# Patient Record
Sex: FEMALE | Race: WHITE | NOT HISPANIC OR LATINO | Employment: OTHER | URBAN - METROPOLITAN AREA
[De-identification: names, ages, dates, MRNs, and addresses within clinical notes are randomized per-mention and may not be internally consistent; named-entity substitution may affect disease eponyms.]

---

## 2024-06-10 DIAGNOSIS — M54.9 BACK PAIN, UNSPECIFIED BACK LOCATION, UNSPECIFIED BACK PAIN LATERALITY, UNSPECIFIED CHRONICITY: ICD-10-CM

## 2024-06-10 DIAGNOSIS — S93.409A SPRAIN OF ANKLE, UNSPECIFIED LATERALITY, UNSPECIFIED LIGAMENT, INITIAL ENCOUNTER: ICD-10-CM

## 2024-08-01 PROBLEM — R13.14 DYSPHAGIA, PHARYNGOESOPHAGEAL PHASE: Status: ACTIVE | Noted: 2024-08-01

## 2024-08-01 PROBLEM — L57.0 ACTINIC KERATOSIS: Status: ACTIVE | Noted: 2022-08-09

## 2024-08-01 PROBLEM — J38.01 VOCAL CORD PARALYSIS, UNILATERAL COMPLETE: Status: ACTIVE | Noted: 2024-08-01

## 2024-08-01 PROBLEM — D36.9 MULTIPLE ADENOMATOUS POLYPS: Status: ACTIVE | Noted: 2024-08-01

## 2024-08-01 PROBLEM — R10.13 EPIGASTRIC PAIN: Status: ACTIVE | Noted: 2024-08-01

## 2024-08-01 PROBLEM — N95.2 ATROPHY OF VAGINA: Status: ACTIVE | Noted: 2024-08-01

## 2024-08-01 PROBLEM — D18.01 HEMANGIOMA OF SKIN AND SUBCUTANEOUS TISSUE: Status: ACTIVE | Noted: 2022-08-09

## 2024-08-01 PROBLEM — R32 URINARY INCONTINENCE: Status: ACTIVE | Noted: 2024-08-01

## 2024-08-01 PROBLEM — M79.2 NEUROPATHIC PAIN: Status: ACTIVE | Noted: 2024-08-01

## 2024-08-01 PROBLEM — M81.8 IDIOPATHIC OSTEOPOROSIS: Status: ACTIVE | Noted: 2024-08-01

## 2024-08-01 PROBLEM — R11.0 NAUSEATED: Status: ACTIVE | Noted: 2024-08-01

## 2024-08-01 PROBLEM — F17.200 TOBACCO USE DISORDER: Status: ACTIVE | Noted: 2024-08-01

## 2024-08-01 PROBLEM — D48.5 NEOPLASM OF UNCERTAIN BEHAVIOR OF SKIN: Status: ACTIVE | Noted: 2022-08-09

## 2024-08-01 PROBLEM — M54.16 LUMBAR RADICULOPATHY: Status: ACTIVE | Noted: 2024-08-01

## 2024-08-01 PROBLEM — J38.1 VOCAL CORD POLYP: Status: ACTIVE | Noted: 2024-08-01

## 2024-08-01 PROBLEM — S32.010S: Status: ACTIVE | Noted: 2024-08-01

## 2024-08-01 PROBLEM — E78.5 HYPERLIPIDEMIA: Status: ACTIVE | Noted: 2024-08-01

## 2024-08-01 PROBLEM — M54.6 BACK PAIN, THORACIC: Status: ACTIVE | Noted: 2024-08-01

## 2024-08-01 PROBLEM — R63.4 ABNORMAL WEIGHT LOSS: Status: ACTIVE | Noted: 2024-08-01

## 2024-08-01 PROBLEM — J98.09 RECURRENT BRONCHOSPASM: Status: ACTIVE | Noted: 2024-08-01

## 2024-08-01 PROBLEM — K21.9 GERD (GASTROESOPHAGEAL REFLUX DISEASE): Status: ACTIVE | Noted: 2024-08-01

## 2024-08-01 PROBLEM — L21.9 SEBORRHEIC DERMATITIS, UNSPECIFIED: Status: ACTIVE | Noted: 2022-08-09

## 2024-08-01 PROBLEM — K63.5 MULTIPLE BENIGN POLYPS OF LARGE INTESTINE: Status: ACTIVE | Noted: 2024-08-01

## 2024-08-01 PROBLEM — E55.9 MILD VITAMIN D DEFICIENCY: Status: ACTIVE | Noted: 2024-08-01

## 2024-08-01 PROBLEM — R49.0 HOARSENESS OF VOICE: Status: ACTIVE | Noted: 2024-08-01

## 2024-08-06 ENCOUNTER — APPOINTMENT (OUTPATIENT)
Dept: PRIMARY CARE | Facility: CLINIC | Age: 79
End: 2024-08-06
Payer: MEDICARE

## 2024-08-06 VITALS
WEIGHT: 108 LBS | SYSTOLIC BLOOD PRESSURE: 166 MMHG | OXYGEN SATURATION: 100 % | HEART RATE: 74 BPM | DIASTOLIC BLOOD PRESSURE: 79 MMHG | BODY MASS INDEX: 19.75 KG/M2

## 2024-08-06 DIAGNOSIS — F17.211 CIGARETTE NICOTINE DEPENDENCE IN REMISSION: ICD-10-CM

## 2024-08-06 DIAGNOSIS — E78.5 DYSLIPIDEMIA: ICD-10-CM

## 2024-08-06 DIAGNOSIS — B37.83 ANGULAR CHEILITIS WITH CANDIDIASIS: ICD-10-CM

## 2024-08-06 DIAGNOSIS — M25.50 ARTHRALGIA, UNSPECIFIED JOINT: ICD-10-CM

## 2024-08-06 DIAGNOSIS — Z12.31 VISIT FOR SCREENING MAMMOGRAM: ICD-10-CM

## 2024-08-06 DIAGNOSIS — M79.2 NEUROPATHIC PAIN: Primary | ICD-10-CM

## 2024-08-06 DIAGNOSIS — M81.8 IDIOPATHIC OSTEOPOROSIS: ICD-10-CM

## 2024-08-06 PROCEDURE — 1170F FXNL STATUS ASSESSED: CPT | Performed by: INTERNAL MEDICINE

## 2024-08-06 PROCEDURE — 1159F MED LIST DOCD IN RCRD: CPT | Performed by: INTERNAL MEDICINE

## 2024-08-06 PROCEDURE — G0439 PPPS, SUBSEQ VISIT: HCPCS | Performed by: INTERNAL MEDICINE

## 2024-08-06 PROCEDURE — 1160F RVW MEDS BY RX/DR IN RCRD: CPT | Performed by: INTERNAL MEDICINE

## 2024-08-06 PROCEDURE — 99214 OFFICE O/P EST MOD 30 MIN: CPT | Performed by: INTERNAL MEDICINE

## 2024-08-06 RX ORDER — CLOTRIMAZOLE AND BETAMETHASONE DIPROPIONATE 10; .64 MG/G; MG/G
1 CREAM TOPICAL 2 TIMES DAILY
Qty: 15 G | Refills: 1 | Status: SHIPPED | OUTPATIENT
Start: 2024-08-06 | End: 2024-10-05

## 2024-08-06 RX ORDER — CLOTRIMAZOLE AND BETAMETHASONE DIPROPIONATE 10; .64 MG/G; MG/G
1 CREAM TOPICAL 2 TIMES DAILY
Qty: 15 G | Refills: 1 | Status: SHIPPED | OUTPATIENT
Start: 2024-08-06 | End: 2024-08-06

## 2024-08-06 ASSESSMENT — ACTIVITIES OF DAILY LIVING (ADL)
GROCERY_SHOPPING: NEEDS ASSISTANCE
TAKING_MEDICATION: INDEPENDENT
BATHING: INDEPENDENT
MANAGING_FINANCES: INDEPENDENT
DRESSING: INDEPENDENT
DOING_HOUSEWORK: NEEDS ASSISTANCE

## 2024-08-06 ASSESSMENT — PATIENT HEALTH QUESTIONNAIRE - PHQ9
SUM OF ALL RESPONSES TO PHQ9 QUESTIONS 1 AND 2: 2
1. LITTLE INTEREST OR PLEASURE IN DOING THINGS: SEVERAL DAYS
2. FEELING DOWN, DEPRESSED OR HOPELESS: SEVERAL DAYS

## 2024-08-06 NOTE — PATIENT INSTRUCTIONS
Neuropathic pain  Checking sugar and b12 level  You do not want any medication/pills for this  Will order a topical cream for neuropathy    Call [phone] to schedule mammogram and ct lung cancer screen (last year for this test)    Osteoporosis, severe, declines medication  Checking vitamin d level    Get fasting labs    Hand tingling is likely due to carpal tunnel. Check to see if pinky is spared, wearing wrist splints can help OR having surgery    Mouth rash/corners of mouth is from yeast  Can use lotrisone cream 1-2 times a day as needed for flares    Diffuse bone/arthritis pain, checking rheumatoid factor

## 2024-08-06 NOTE — PROGRESS NOTES
Subjective   Patient ID: Ludy Quintero is a 78 y.o. female who presents for Medicare Annual Wellness Visit Subsequent.    HPI     Review of Systems    Objective   Pulse 74   Wt 49 kg (108 lb)   SpO2 100%   BMI 19.75 kg/m²     Physical Exam    Assessment/Plan

## 2024-08-06 NOTE — PROGRESS NOTES
Subjective   Patient ID: Ludy Quintero is a 78 y.o. female who presents for Medicare Annual Wellness Visit Subsequent.    She has cataracts and is almost legally blind  Her fingers tingle  Her feet are numb and tingling  Her hip, some days she walks well and sometimes not  Sitting on her tailbone hurts for last 6-8 years  The hip that fractured on the left still hurts and the femur hurts  She uses a rollator to get around. Is an all terrain  She cannot walk with her cane, makes her bri sided  Her good friend  and has been very hard. Helping her friend's niece clear things out  She is showing signs of radiation poison. Under her left breast has a firm nodule that is sore.   Her right second finger is swelling.   Swells at the thumb and second fingers  She uses CBD cream on it at night.   Her feet are on fire all the time.     Her bp goes up and down, but usually in the normal range.   She does not want any prescription  She is taking vitamin b12 and vitamin d12. Takes cbd with omega 3.   That is the only thing that is helpful.     Miranga omephelia, she takes at times and got from rich and takes 2 of those a day.     She does not want treatment for her osteoporosis.     She cannot go into the laundry with strong smells, or any other strong smells, she cannot get her breath  She reacts to everything that I give her anymore.   Sometimes gets it with swallowing water but is not persistent.   She has used rescue inhaler and it landed her in the ER.   She gets swelling in her elbows and they throb and get red and huge.     She doesn't want to take any medication, but she does want to know what to expect.          Review of Systems    Objective   /79   Pulse 74   Wt 49 kg (108 lb)   SpO2 100%   BMI 19.75 kg/m²     Physical Exam  Constitutional:       Appearance: Normal appearance.   Neck:      Vascular: No carotid bruit.   Cardiovascular:      Rate and Rhythm: Normal rate and regular rhythm.       "Pulses: Normal pulses.      Heart sounds: No murmur heard.  Pulmonary:      Effort: Pulmonary effort is normal.      Breath sounds: Normal breath sounds.   Abdominal:      Palpations: Abdomen is soft. There is no mass.      Tenderness: There is no abdominal tenderness.      Comments: No HSM or pulsatile mass   Musculoskeletal:      Right lower leg: No edema.      Left lower leg: No edema.      Comments: No synoviitis noted  B/l thumbs palmar displacement at the CMC, worse on the right  Right second finger with heb node  No warmth or swelling at the elbows or wrists       Lymphadenopathy:      Cervical: No cervical adenopathy.   Skin:     Coloration: Skin is not jaundiced or pale.      Comments: Clubbing noted of the nails  Palms have red areas in clusters like telangectasias  No calcinosis noted  No raynaud's noted  No synovitis  Angles of the mouth with redness in the creases b/l    Multiple raised tan lesions on face and trunk/breasts   Neurological:      Mental Status: She is alert and oriented to person, place, and time.   Psychiatric:      Comments: PHQ9 is 12  GAD7 is 8         Assessment/Plan          Patient Instructions   Neuropathic pain  Checking sugar and b12 level  You do not want any medication/pills for this  Will order a topical cream for neuropathy    Call [phone] to schedule mammogram and ct lung cancer screen (last year for this test)    Osteoporosis, severe, declines medication  Checking vitamin d level    Get fasting labs    Hand tingling is likely due to carpal tunnel. Check to see if pinky is spared, wearing wrist splints can help OR having surgery    Mouth rash/corners of mouth is from yeast  Can use lotrisone cream 1-2 times a day as needed for flares    Diffuse bone/arthritis pain, checking rheumatoid factor     Pt is not interested in any medications or treatments due to prior side effects, but states \"I want to know what's coming\"    Declines all vaccines    Pt with mobility issues, " wrote script for ramp for house last week.    Telangectasia and clubbing but no other findings of scleroderma

## 2024-08-08 ENCOUNTER — LAB (OUTPATIENT)
Dept: LAB | Facility: LAB | Age: 79
End: 2024-08-08
Payer: MEDICARE

## 2024-08-08 DIAGNOSIS — E78.5 DYSLIPIDEMIA: ICD-10-CM

## 2024-08-08 DIAGNOSIS — M79.2 NEUROPATHIC PAIN: ICD-10-CM

## 2024-08-08 DIAGNOSIS — M81.8 IDIOPATHIC OSTEOPOROSIS: ICD-10-CM

## 2024-08-08 DIAGNOSIS — M25.50 ARTHRALGIA, UNSPECIFIED JOINT: ICD-10-CM

## 2024-08-08 LAB
ALBUMIN SERPL BCP-MCNC: 4.5 G/DL (ref 3.4–5)
ALP SERPL-CCNC: 58 U/L (ref 33–136)
ALT SERPL W P-5'-P-CCNC: 10 U/L (ref 7–45)
ANION GAP SERPL CALC-SCNC: 13 MMOL/L (ref 10–20)
AST SERPL W P-5'-P-CCNC: 10 U/L (ref 9–39)
BASOPHILS # BLD AUTO: 0.14 X10*3/UL (ref 0–0.1)
BASOPHILS NFR BLD AUTO: 1.5 %
BILIRUB SERPL-MCNC: 0.5 MG/DL (ref 0–1.2)
BUN SERPL-MCNC: 11 MG/DL (ref 6–23)
CALCIUM SERPL-MCNC: 9.5 MG/DL (ref 8.6–10.3)
CHLORIDE SERPL-SCNC: 104 MMOL/L (ref 98–107)
CHOLEST SERPL-MCNC: 246 MG/DL (ref 0–199)
CHOLESTEROL/HDL RATIO: 5.8
CO2 SERPL-SCNC: 31 MMOL/L (ref 21–32)
CREAT SERPL-MCNC: 0.96 MG/DL (ref 0.5–1.05)
CRP SERPL-MCNC: 0.12 MG/DL
EGFRCR SERPLBLD CKD-EPI 2021: 61 ML/MIN/1.73M*2
EOSINOPHIL # BLD AUTO: 0.23 X10*3/UL (ref 0–0.4)
EOSINOPHIL NFR BLD AUTO: 2.5 %
ERYTHROCYTE [DISTWIDTH] IN BLOOD BY AUTOMATED COUNT: 13.5 % (ref 11.5–14.5)
GLUCOSE SERPL-MCNC: 84 MG/DL (ref 74–99)
HCT VFR BLD AUTO: 42.5 % (ref 36–46)
HDLC SERPL-MCNC: 42.6 MG/DL
HGB BLD-MCNC: 13.8 G/DL (ref 12–16)
IMM GRANULOCYTES # BLD AUTO: 0.03 X10*3/UL (ref 0–0.5)
IMM GRANULOCYTES NFR BLD AUTO: 0.3 % (ref 0–0.9)
LDLC SERPL CALC-MCNC: 180 MG/DL
LYMPHOCYTES # BLD AUTO: 2.97 X10*3/UL (ref 0.8–3)
LYMPHOCYTES NFR BLD AUTO: 31.8 %
MCH RBC QN AUTO: 32.5 PG (ref 26–34)
MCHC RBC AUTO-ENTMCNC: 32.5 G/DL (ref 32–36)
MCV RBC AUTO: 100 FL (ref 80–100)
MONOCYTES # BLD AUTO: 0.58 X10*3/UL (ref 0.05–0.8)
MONOCYTES NFR BLD AUTO: 6.2 %
NEUTROPHILS # BLD AUTO: 5.4 X10*3/UL (ref 1.6–5.5)
NEUTROPHILS NFR BLD AUTO: 57.7 %
NON HDL CHOLESTEROL: 203 MG/DL (ref 0–149)
NRBC BLD-RTO: 0 /100 WBCS (ref 0–0)
PLATELET # BLD AUTO: 351 X10*3/UL (ref 150–450)
POTASSIUM SERPL-SCNC: 4.6 MMOL/L (ref 3.5–5.3)
PROT SERPL-MCNC: 6.7 G/DL (ref 6.4–8.2)
RBC # BLD AUTO: 4.24 X10*6/UL (ref 4–5.2)
SODIUM SERPL-SCNC: 143 MMOL/L (ref 136–145)
TRIGL SERPL-MCNC: 118 MG/DL (ref 0–149)
TSH SERPL-ACNC: 1.87 MIU/L (ref 0.44–3.98)
VLDL: 24 MG/DL (ref 0–40)
WBC # BLD AUTO: 9.4 X10*3/UL (ref 4.4–11.3)

## 2024-08-08 PROCEDURE — 83970 ASSAY OF PARATHORMONE: CPT

## 2024-08-08 PROCEDURE — 80061 LIPID PANEL: CPT

## 2024-08-08 PROCEDURE — 84443 ASSAY THYROID STIM HORMONE: CPT

## 2024-08-08 PROCEDURE — 85025 COMPLETE CBC W/AUTO DIFF WBC: CPT

## 2024-08-08 PROCEDURE — 36415 COLL VENOUS BLD VENIPUNCTURE: CPT

## 2024-08-08 PROCEDURE — 86140 C-REACTIVE PROTEIN: CPT

## 2024-08-08 PROCEDURE — 86431 RHEUMATOID FACTOR QUANT: CPT

## 2024-08-08 PROCEDURE — 82607 VITAMIN B-12: CPT

## 2024-08-08 PROCEDURE — 80053 COMPREHEN METABOLIC PANEL: CPT

## 2024-08-08 PROCEDURE — 82306 VITAMIN D 25 HYDROXY: CPT

## 2024-08-09 LAB
25(OH)D3 SERPL-MCNC: 33 NG/ML (ref 30–100)
PTH-INTACT SERPL-MCNC: 33.1 PG/ML (ref 18.5–88)
RHEUMATOID FACT SER NEPH-ACNC: <10 IU/ML (ref 0–15)
VIT B12 SERPL-MCNC: 419 PG/ML (ref 211–911)

## 2024-08-16 ENCOUNTER — HOSPITAL ENCOUNTER (OUTPATIENT)
Dept: RADIOLOGY | Facility: HOSPITAL | Age: 79
Discharge: HOME | End: 2024-08-16
Payer: MEDICARE

## 2024-08-16 VITALS — HEIGHT: 62 IN | BODY MASS INDEX: 20.24 KG/M2 | WEIGHT: 110 LBS

## 2024-08-16 DIAGNOSIS — F17.211 CIGARETTE NICOTINE DEPENDENCE IN REMISSION: ICD-10-CM

## 2024-08-16 DIAGNOSIS — Z12.31 VISIT FOR SCREENING MAMMOGRAM: ICD-10-CM

## 2024-08-16 PROCEDURE — 77063 BREAST TOMOSYNTHESIS BI: CPT | Performed by: STUDENT IN AN ORGANIZED HEALTH CARE EDUCATION/TRAINING PROGRAM

## 2024-08-16 PROCEDURE — 71271 CT THORAX LUNG CANCER SCR C-: CPT

## 2024-08-16 PROCEDURE — 77067 SCR MAMMO BI INCL CAD: CPT | Performed by: STUDENT IN AN ORGANIZED HEALTH CARE EDUCATION/TRAINING PROGRAM

## 2024-08-16 PROCEDURE — 77067 SCR MAMMO BI INCL CAD: CPT

## 2025-04-02 NOTE — PROGRESS NOTES
Subjective   Patient ID: Ludy Quintero is a 79 y.o. female who presents for Establish Care for follow up of chronic conditions   Today she is accompanied by alone.     HPI  Dyslipidemia  Patient currently is not taking any medication.  Last lab was done in August 2024 and were positive for cholesterol 246, HDL 42, , triglyceride 118.   Low-dose CT scan done in August 2024 was estimating coronary calcium score about 148.   Not open to medication    Hypertension  BP not at goal  Patient is not on any medication and reports having normal blood pressure at home.    had cancer who was diagnosed in the past 3 days.   Sees dr. Wang     Hx of brain aneurism in 2000  [place]alyse on the left side.   Had coiled at that time. She was in coma at that time.     COPD  Patient currently is  not taking medication.   Denied any cough, shortness of breath, chest pain, increased sputum production, fever.  CT scan done in August 2024, was positive for emphysema, new changes in apical bases with new nodules on the lower part of the lungs for which recommended to repeat CT scan in 6 months.   Does have sob, hoarse voice, cough with clear phlem   Does not want to see the  specialist.   She would like oxygen tank.       Arthritis  Compression fractures  Anterolisthesis  Scan done in 2022 was positive for osteoporosis in the hip and outer part of bones.   Patient is taking cbd, moringa for osteoporosis    GERD  Patient had EGD done in 2018.   Currently she is  not taking    Dysfunction Vocal cord   Due to life support.         No current outpatient medications on file prior to visit.     No current facility-administered medications on file prior to visit.        Allergies   Allergen Reactions    Hydrocodone-Acetaminophen Anaphylaxis    Prednisone Shortness of breath    Budesonide-Formoterol Unknown    Fluticasone Propion-Salmeterol Other    Hydrocodone-Guaifenesin Unknown     VICODIN TABS    Meperidine Unknown    Penicillins  "Unknown    Sulfa (Sulfonamide Antibiotics) Anxiety    Tagamet [Cimetidine] Anxiety         There is no immunization history on file for this patient.      Review of Systems  All pertinent positive symptoms are included in the history of present illness.  All other systems have been reviewed and are negative and noncontributory to this patient's current ailments.     Objective   /64   Pulse (!) 117   Ht 1.575 m (5' 2\")   Wt 48.5 kg (107 lb)   SpO2 98%   BMI 19.57 kg/m²   BSA: 1.46 meters squared  Office Visit on 04/03/2025   Component Date Value Ref Range Status    CHOLESTEROL, TOTAL 04/10/2025 249 (H)  <200 mg/dL Final    HDL CHOLESTEROL 04/10/2025 53  > OR = 50 mg/dL Final    TRIGLYCERIDES 04/10/2025 98  <150 mg/dL Final    LDL-CHOLESTEROL 04/10/2025 174 (H)  mg/dL (calc) Final    Comment: Reference range: <100     Desirable range <100 mg/dL for primary prevention;    <70 mg/dL for patients with CHD or diabetic patients   with > or = 2 CHD risk factors.     LDL-C is now calculated using the Dionisio-Denis   calculation, which is a validated novel method providing   better accuracy than the Friedewald equation in the   estimation of LDL-C.   Dionisio SS et al. CICI. 2013;310(19): 4415-7162   (http://education.[employer].twtrland/faq/RCU659)      CHOL/HDLC RATIO 04/10/2025 4.7  <5.0 (calc) Final    NON HDL CHOLESTEROL 04/10/2025 196 (H)  <130 mg/dL (calc) Final    Comment: For patients with diabetes plus 1 major ASCVD risk   factor, treating to a non-HDL-C goal of <100 mg/dL   (LDL-C of <70 mg/dL) is considered a therapeutic   option.      GLUCOSE 04/10/2025 95  65 - 139 mg/dL Final    Comment:           Non-fasting reference interval         UREA NITROGEN (BUN) 04/10/2025 18  7 - 25 mg/dL Final    CREATININE 04/10/2025 0.94  0.60 - 1.00 mg/dL Final    EGFR 04/10/2025 62  > OR = 60 mL/min/1.73m2 Final    SODIUM 04/10/2025 137  135 - 146 mmol/L Final    POTASSIUM 04/10/2025 4.4  3.5 - 5.3 mmol/L Final    " CHLORIDE 04/10/2025 102  98 - 110 mmol/L Final    CARBON DIOXIDE 04/10/2025 27  20 - 32 mmol/L Final    ELECTROLYTE BALANCE 04/10/2025 8  7 - 17 mmol/L (calc) Final    CALCIUM 04/10/2025 9.6  8.6 - 10.4 mg/dL Final    PROTEIN, TOTAL 04/10/2025 7.2  6.1 - 8.1 g/dL Final    ALBUMIN 04/10/2025 4.5  3.6 - 5.1 g/dL Final    BILIRUBIN, TOTAL 04/10/2025 0.4  0.2 - 1.2 mg/dL Final    ALKALINE PHOSPHATASE 04/10/2025 64  37 - 153 U/L Final    AST 04/10/2025 10  10 - 35 U/L Final    ALT 04/10/2025 7  6 - 29 U/L Final    WHITE BLOOD CELL COUNT 04/10/2025 9.6  3.8 - 10.8 Thousand/uL Final    RED BLOOD CELL COUNT 04/10/2025 4.01  3.80 - 5.10 Million/uL Final    HEMOGLOBIN 04/10/2025 13.1  11.7 - 15.5 g/dL Final    HEMATOCRIT 04/10/2025 39.1  35.0 - 45.0 % Final    MCV 04/10/2025 97.5  80.0 - 100.0 fL Final    MCH 04/10/2025 32.7  27.0 - 33.0 pg Final    MCHC 04/10/2025 33.5  32.0 - 36.0 g/dL Final    Comment: For adults, a slight decrease in the calculated MCHC  value (in the range of 30 to 32 g/dL) is most likely  not clinically significant; however, it should be  interpreted with caution in correlation with other  red cell parameters and the patient's clinical  condition.      RDW 04/10/2025 12.6  11.0 - 15.0 % Final    PLATELET COUNT 04/10/2025 354  140 - 400 Thousand/uL Final    MPV 04/10/2025 9.2  7.5 - 12.5 fL Final       Physical Exam  CONSTITUTIONAL - well nourished, well developed, looks like stated age, in no acute distress, not ill-appearing, and not tired appearing  SKIN - violacea discoloration of the skin in the left foot.   HEAD - no trauma, normocephalic  EYES - pupils are equal and reactive to light, extraocular muscles are intact, and normal external exam  NECK - supple without rigidity, no neck mass was observed, no thyromegaly or thyroid nodules  CHEST - clear to auscultation, no wheezing, no crackles and no rales, good effort  CARDIAC - regular rate and regular rhythm, no skipped beats, no murmur  ABDOMEN -  no organomegaly, soft, nontender, nondistended, normal bowel sounds, no guarding/rebound/rigidit  EXTREMITIES - no edema, no deformities  PSYCHIATRIC - alert, pleasant and cordial, age-appropriate      Assessment/Plan   Patient had multiple comorbidity but declined any medication at this visit despite the recommendation.    Diagnoses and all orders for this visit:  Dyslipidemia  -     Lipid Panel; Future  -Patient does not want to take any medication despite the recommendation.   Hypertension, unspecified type  -     Comprehensive Metabolic Panel; Future  -Patient is not on any blood pressure medication.   -Her blood pressure was elevated on repeat measurement  -Advised to measure blood pressure in 1 to 2 weeks and let us know about her numbers.     Abnormal CBC  -     CBC; Future    COPD  Vocal cord dysfunction  emphysema  -Patient does not want to do any further testing such as low-dose CT scan.   -Patient would benefit from medication, inhalers but does not want to take any medication for her COPD/asthma/vocal cord dysfunction.   -Declined seeing a specialist      GERD  Patient had endoscopy in the past but is not taking any medication.     Osteoporosis  -She is not on any medication at this time.     Follow up in 3 months for chronic management or sooner if needed.     I discussed my plan with my attending, Dr. Rick Perrin. MD  Family medicine resident  PGY3

## 2025-04-03 ENCOUNTER — APPOINTMENT (OUTPATIENT)
Dept: PRIMARY CARE | Facility: CLINIC | Age: 80
End: 2025-04-03
Payer: MEDICARE

## 2025-04-03 VITALS
HEART RATE: 117 BPM | BODY MASS INDEX: 19.69 KG/M2 | OXYGEN SATURATION: 98 % | DIASTOLIC BLOOD PRESSURE: 64 MMHG | WEIGHT: 107 LBS | SYSTOLIC BLOOD PRESSURE: 148 MMHG | HEIGHT: 62 IN

## 2025-04-03 DIAGNOSIS — R79.89 ABNORMAL CBC: ICD-10-CM

## 2025-04-03 DIAGNOSIS — E78.5 DYSLIPIDEMIA: Primary | ICD-10-CM

## 2025-04-03 DIAGNOSIS — I10 HYPERTENSION, UNSPECIFIED TYPE: ICD-10-CM

## 2025-04-03 DIAGNOSIS — J43.9 PULMONARY EMPHYSEMA, UNSPECIFIED EMPHYSEMA TYPE (MULTI): ICD-10-CM

## 2025-04-03 DIAGNOSIS — K21.9 GASTROESOPHAGEAL REFLUX DISEASE, UNSPECIFIED WHETHER ESOPHAGITIS PRESENT: ICD-10-CM

## 2025-04-03 DIAGNOSIS — M81.8 IDIOPATHIC OSTEOPOROSIS: ICD-10-CM

## 2025-04-03 PROCEDURE — 3077F SYST BP >= 140 MM HG: CPT

## 2025-04-03 PROCEDURE — 1159F MED LIST DOCD IN RCRD: CPT

## 2025-04-03 PROCEDURE — 99204 OFFICE O/P NEW MOD 45 MIN: CPT

## 2025-04-03 PROCEDURE — 3078F DIAST BP <80 MM HG: CPT

## 2025-04-03 ASSESSMENT — PATIENT HEALTH QUESTIONNAIRE - PHQ9
1. LITTLE INTEREST OR PLEASURE IN DOING THINGS: NOT AT ALL
2. FEELING DOWN, DEPRESSED OR HOPELESS: NOT AT ALL
SUM OF ALL RESPONSES TO PHQ9 QUESTIONS 1 AND 2: 0

## 2025-04-10 LAB
ALBUMIN SERPL-MCNC: 4.5 G/DL (ref 3.6–5.1)
ALP SERPL-CCNC: 64 U/L (ref 37–153)
ALT SERPL-CCNC: 7 U/L (ref 6–29)
ANION GAP SERPL CALCULATED.4IONS-SCNC: 8 MMOL/L (CALC) (ref 7–17)
AST SERPL-CCNC: 10 U/L (ref 10–35)
BILIRUB SERPL-MCNC: 0.4 MG/DL (ref 0.2–1.2)
BUN SERPL-MCNC: 18 MG/DL (ref 7–25)
CALCIUM SERPL-MCNC: 9.6 MG/DL (ref 8.6–10.4)
CHLORIDE SERPL-SCNC: 102 MMOL/L (ref 98–110)
CHOLEST SERPL-MCNC: 249 MG/DL
CHOLEST/HDLC SERPL: 4.7 (CALC)
CO2 SERPL-SCNC: 27 MMOL/L (ref 20–32)
CREAT SERPL-MCNC: 0.94 MG/DL (ref 0.6–1)
EGFRCR SERPLBLD CKD-EPI 2021: 62 ML/MIN/1.73M2
ERYTHROCYTE [DISTWIDTH] IN BLOOD BY AUTOMATED COUNT: 12.6 % (ref 11–15)
GLUCOSE SERPL-MCNC: 95 MG/DL (ref 65–139)
HCT VFR BLD AUTO: 39.1 % (ref 35–45)
HDLC SERPL-MCNC: 53 MG/DL
HGB BLD-MCNC: 13.1 G/DL (ref 11.7–15.5)
LDLC SERPL CALC-MCNC: 174 MG/DL (CALC)
MCH RBC QN AUTO: 32.7 PG (ref 27–33)
MCHC RBC AUTO-ENTMCNC: 33.5 G/DL (ref 32–36)
MCV RBC AUTO: 97.5 FL (ref 80–100)
NONHDLC SERPL-MCNC: 196 MG/DL (CALC)
PLATELET # BLD AUTO: 354 THOUSAND/UL (ref 140–400)
PMV BLD REES-ECKER: 9.2 FL (ref 7.5–12.5)
POTASSIUM SERPL-SCNC: 4.4 MMOL/L (ref 3.5–5.3)
PROT SERPL-MCNC: 7.2 G/DL (ref 6.1–8.1)
RBC # BLD AUTO: 4.01 MILLION/UL (ref 3.8–5.1)
SODIUM SERPL-SCNC: 137 MMOL/L (ref 135–146)
TRIGL SERPL-MCNC: 98 MG/DL
WBC # BLD AUTO: 9.6 THOUSAND/UL (ref 3.8–10.8)

## 2025-10-03 ENCOUNTER — APPOINTMENT (OUTPATIENT)
Facility: CLINIC | Age: 80
End: 2025-10-03
Payer: MEDICARE